# Patient Record
Sex: MALE | Race: OTHER | HISPANIC OR LATINO | URBAN - METROPOLITAN AREA
[De-identification: names, ages, dates, MRNs, and addresses within clinical notes are randomized per-mention and may not be internally consistent; named-entity substitution may affect disease eponyms.]

---
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
## 2022-09-19 ENCOUNTER — EMERGENCY (EMERGENCY)
Facility: HOSPITAL | Age: 22
LOS: 0 days | Discharge: HOME | End: 2022-09-20
Attending: EMERGENCY MEDICINE | Admitting: EMERGENCY MEDICINE

## 2022-09-19 VITALS
TEMPERATURE: 99 F | RESPIRATION RATE: 18 BRPM | SYSTOLIC BLOOD PRESSURE: 175 MMHG | WEIGHT: 178.57 LBS | HEART RATE: 107 BPM | OXYGEN SATURATION: 98 % | DIASTOLIC BLOOD PRESSURE: 87 MMHG
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
## 2022-09-19 DIAGNOSIS — S63.124A DISLOCATION OF INTERPHALANGEAL JOINT OF RIGHT THUMB, INITIAL ENCOUNTER: ICD-10-CM
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
## 2022-09-19 DIAGNOSIS — M79.644 PAIN IN RIGHT FINGER(S): ICD-10-CM
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
## 2022-09-19 PROCEDURE — 73130 X-RAY EXAM OF HAND: CPT | Mod: 26,RT

## 2022-09-19 PROCEDURE — 29125 APPL SHORT ARM SPLINT STATIC: CPT | Mod: RT

## 2022-09-19 PROCEDURE — 99284 EMERGENCY DEPT VISIT MOD MDM: CPT | Mod: 25

## 2022-09-19 NOTE — ED PEDIATRIC TRIAGE NOTE - NS ED TRIAGE [facility] SCALE
COVID-19 Test negative.    Diarrhea-  Continue bland food today. Try bananas, yogurt as well.   Advised to use probiotics (over the counter age appropriate)- take daily for few days to 1 week.  Decrease sugary drinks. Continue Pedialyte/Powerade/G2 gatorade.   .  Fever-  Can return to school Wednesday if no temp above 101 on Tuesday.   Continue to monitor.     Rash-  use medication ointment  place plain vaseline over the medication.  Use sunscreen  Dermatology- call and schedule  Call [phone] to schedule with [facility]             Alert-The patient is alert, awake and responds to voice. The patient is oriented to time, place, and person. The triage nurse is able to obtain subjective information.

## 2022-09-19 NOTE — ED PROVIDER NOTE - CLINICAL SUMMARY MEDICAL DECISION MAKING FREE TEXT BOX
Patient showed photo of thumb prior to "pulling" it very clearly looks like he was dislocated at DIP, XR shows relocation, no fracture.    Placed in thumb spica, will dc with ortho follow up, return precautions.

## 2022-09-19 NOTE — ED PROVIDER NOTE - PHYSICAL EXAMINATION
VITAL SIGNS: I have reviewed nursing notes and confirm.  CONSTITUTIONAL: Well-developed; well-nourished; in no acute distress.  SKIN: Skin exam is warm and dry, no acute rash.  HEAD: Normocephalic; atraumatic.  EYES: PERRL, EOM intact; conjunctiva and sclera clear.  ENT: No nasal discharge; airway clear.   NECK: Supple; non tender.  CARD: S1, S2 normal; no murmurs, gallops, or rubs. Regular rate and rhythm.  RESP: No wheezes, rales or rhonchi.  ABD: Normal bowel sounds; soft; non-distended; non-tender  EXT: Normal ROM, ttp over thenar eminence of R, pain with forced extension of R thumb, good cap refill, good distal pulses  NEURO: Alert, oriented. Grossly unremarkable. No focal deficits.  PSYCH: Cooperative, appropriate.

## 2022-09-19 NOTE — ED PROVIDER NOTE - CARE PROVIDER_API CALL
Homero Bauman)  Orthopaedic Surgery  [address] [place] [address]  Phone: [phone]  Fax: [phone]  Follow Up Time: 4-6 Days

## 2022-09-19 NOTE — ED PROVIDER NOTE - OBJECTIVE STATEMENT
21-year-old male, no known history, here for assessment of right thumb pain.  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  Patient notes he pulled on his thumb and it improved of the deformity but he still had pain prompting ED visit.    No change in sensation, color, temperature.

## 2022-09-19 NOTE — ED PROVIDER NOTE - NSFOLLOWUPINSTRUCTIONS_ED_ALL_ED_FT
Our Emergency Department Referral Coordinators will be reaching out to you in the next 24-48 hours from 9:00am to 5:00pm (Monday - Friday) with a follow up appointment. Please expect a phone call from the hospital in that time frame. If you do not receive a call or if you have any questions or concerns, you can reach them at [phone] or [phone].      Finger or Thumb Dislocation      Finger or thumb dislocation happens when the bones in a joint move out of their normal positions. Dislocations may occur in any joint in the fingers or thumb. Finger or thumb dislocation is a common and serious injury.      What are the causes?    This condition is caused by a forceful impact or injury to the hand or when the finger or thumb bends the wrong way (hyperextension).      What increases the risk?    You are more likely to develop this condition if you:  •Have previously injured your hand.      •Do repetitive motions with your hands, such as when playing sports or doing heavy labor.      •Have poor hand strength and flexibility.        What are the signs or symptoms?    Symptoms of this condition may include:  •Deformity of the injured area. The affected joint may look like it is out of place or at an odd angle.      •Pain, swelling, and bruising in the injured area.      •Limited range of motion of the finger or thumb.        How is this diagnosed?    This condition is diagnosed with a physical exam. You may also have X-rays to check for breaks (fractures) in your bones.      How is this treated?    For mild dislocations, this condition is treated by moving your finger or thumb back into position (reduction). Your health care provider may do this by hand (manually) or with surgery. You may need surgical reduction if you have:  •A severe dislocation.      •A dislocation that cannot be reduced manually.      •A fractured bone.      •An open wound.      After reduction, your finger or thumb may be kept in a fixed position (immobilized) with a splint for up to 6 weeks. You may also need physical therapy. In some cases, you may need to go to a health care provider who specializes in bone disorders (orthopedist) to help treat your condition.      Follow these instructions at home:    If you have a splint:     • Do not put pressure on any part of the splint until it is fully hardened. This may take several hours.      •Wear the splint as told by your health care provider. Remove it only as told by your health care provider.      •Loosen the splint if your fingers tingle, become numb, or turn cold and blue.      •Keep the splint clean.    •If the splint is not waterproof:  •Do not let it get wet.      •Cover it with a watertight covering when you take a bath or shower.          Managing pain, stiffness, and swelling   A bag of ice on a towel on the skin. •If directed, put ice on the injured area.  •If you have a removable splint, remove it as told by your health care provider.      •Put ice in a plastic bag.      •Place a towel between your skin and the bag.      •Leave the ice on for 20 minutes, 2–3 times a day.        •Move your fingers often to reduce stiffness and swelling.      •Raise (elevate) the injured area above the level of your heart while you are sitting or lying down.      Activity     •Return to your normal activities as told by your health care provider. Ask your health care provider what activities are safe for you.      •Rest and limit your hand movement as told by your health care provider.      •If physical therapy was prescribed, do exercises as told by your health care provider.      Driving     •Ask your health care provider if the medicine prescribed to you requires you to avoid driving or using heavy machinery.      •Ask your health care provider when it is safe to drive if you have a splint on your hand.      General instructions     •Take over-the-counter and prescription medicines only as told by your health care provider.      • Do not take baths, swim, or use a hot tub until your health care provider approves. Ask your health care provider if you may take showers. You may only be allowed to take sponge baths.      • Do not use any products that contain nicotine or tobacco, such as cigarettes, e-cigarettes, and chewing tobacco. These can delay bone healing. If you need help quitting, ask your health care provider.      •Keep all follow-up visits as told by your health care provider. This is important.        Contact a health care provider if you have:    •Problems with your splint.      •Pain that gets worse or does not get better with medicine.      •More bruising, swelling, or redness in your injured area.      •Difficulty moving your finger or thumb after it heals.        Get help right away if:    •You develop numbness in your finger or thumb.      •You cannot move your finger or thumb.      •Your finger or thumb is pale or cold.      •You have severe pain.        Summary    •Finger or thumb dislocation happens when the bones in a joint move out of their normal positions.      •This condition is caused by a forceful impact or injury to the hand.      •For mild dislocations, this condition is treated by moving your finger or thumb back into position (reduction).      •You may need surgery if you have a severe dislocation, an open wound, or a fractured bone.      This information is not intended to replace advice given to you by your health care provider. Make sure you discuss any questions you have with your health care provider.

## 2022-09-19 NOTE — ED PROVIDER NOTE - PATIENT PORTAL LINK FT
You can access the FollowMyHealth Patient Portal offered by [facility] by registering at the following website: [email or web address]. By joining [employer]’s FollowMyHealth portal, you will also be able to view your health information using other applications (apps) compatible with our system.

## 2022-09-19 NOTE — ED PROVIDER NOTE - NS ED ROS FT
Constitutional: no fever, chills, no recent weight loss, change in appetite or malaise  Cardiac: No chest pain, SOB or edema.  Respiratory: No cough or respiratory distress  GI: No nausea, vomiting, diarrhea or abdominal pain.  : No dysuria, frequency, urgency or hematuria  MS: see HPI  Neuro: no headache, weakness  Skin: No skin rash.  Except as documented in the HPI, all other systems are negative.

## 2022-09-19 NOTE — ED PROVIDER NOTE - NSPTACCESSSVCSAPPTDETAILS_ED_ALL_ED_FT
presumed dislocation relocation injury of R thumb, ttp over thenar eminence, splinted, suspect ligamentous injury

## 2022-09-21 ENCOUNTER — APPOINTMENT (OUTPATIENT)
Dept: ORTHOPEDIC SURGERY | Facility: CLINIC | Age: 22
End: 2022-09-21

## 2022-09-21 ENCOUNTER — NON-APPOINTMENT (OUTPATIENT)
Age: 22
End: 2022-09-21

## 2022-09-21 VITALS — WEIGHT: 180 LBS | BODY MASS INDEX: 28.93 KG/M2 | HEIGHT: 66 IN

## 2022-09-21 DIAGNOSIS — Z78.9 OTHER SPECIFIED HEALTH STATUS: ICD-10-CM

## 2022-09-21 PROCEDURE — 99203 OFFICE O/P NEW LOW 30 MIN: CPT

## 2022-09-21 NOTE — DISCUSSION/SUMMARY
[de-identified] :   IMPRESSION:   STATUS POST DISLOCATION OF THE IP JOINT OF THE RIGHT THUMB.  \par \par \par PLAN:  IMMOBILIZATION WITH A STAX PLANE FOR ABOUT A WEEK, PATIENT WAS ADVISED TO APPLY ICE.  \par PATIENT WAS ADVISED TO REST AND AVOID HEAVY LIFTING OR SIGNIFICANT PHYSICAL ACTIVITIES WITH THE RIGHT HAND.  \par \par \par PATIENT IS MODERATE TEMPORARY DISABLED, UNABLE TO RETURN TO WORK SINCE HIS JOB IS QUITE PHYSICAL, PATIENT WILL STAY OUT OF WORK UNTIL FURTHER NOTICE.  \par PATIENT WAS ADVISED THAT THIS INJURY MAY TAKE ABOUT 4 WEEKS FOR HEALING.\par \par NEXT APPOINTMENT WITH AS AS RETURN TO WORK TODAY.\par \par FOLLOW-UP IN 1 WEEK

## 2022-09-21 NOTE — HISTORY OF PRESENT ILLNESS
[de-identified] :   PATIENT IS A 21-YEAR-OLD MALE HE SHOULD USING THE RIGHT THUMB, PATIENT STATES THAT SEPTEMBER 20 HE WAS HE PATIENT STATES HE DISTAL ASPECT OF  HIS THUMB WAS DISLOCATED AND HE REDUCE IT IN THE SOCCER FIELD, AFTER THAT HE WENT TO THE HOSPITAL, PATIENT WAS ADVISED THAT THE X-RAY WAS NEGATIVE FOR ANY ACUTE FRACTURE OR DISLOCATION, HE WAS SPLINTED AND ADVISED TO FOLLOW UP WITH ORTHOPEDICS.\par \par PATIENT DENIES ANY MEDICAL PROBLEMS.  \par PATIENT STATES THAT HE WORKS FOR [employer].

## 2022-10-04 ENCOUNTER — NON-APPOINTMENT (OUTPATIENT)
Age: 22
End: 2022-10-04

## 2022-10-04 ENCOUNTER — APPOINTMENT (OUTPATIENT)
Dept: ORTHOPEDIC SURGERY | Facility: CLINIC | Age: 22
End: 2022-10-04

## 2022-10-04 DIAGNOSIS — S63.279A DISLOCATION OF UNSPECIFIED INTERPHALANGEAL JOINT OF UNSPECIFIED FINGER, INITIAL ENCOUNTER: ICD-10-CM

## 2022-10-04 PROCEDURE — 73140 X-RAY EXAM OF FINGER(S): CPT | Mod: F5

## 2022-10-04 PROCEDURE — 99214 OFFICE O/P EST MOD 30 MIN: CPT

## 2022-10-04 NOTE — ASSESSMENT
[FreeTextEntry1] :  patient an IP joint dislocation.  He is doing well.  He is going to use his thumb normally.  He will follow up with me as needed.

## 2022-10-04 NOTE — IMAGING
[de-identified] :   Right hand:  mild swelling in IP joint of thumb, nontender well patient, good range of motion slightly decreased compared to opposite side, neurovascularly intact

## 2022-10-04 NOTE — HISTORY OF PRESENT ILLNESS
[de-identified] : Patient comes in after having a right thumb IP joint dislocation.  He is doing well.  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  He has no complaints.  He would like to return to work.